# Patient Record
(demographics unavailable — no encounter records)

---

## 2024-12-19 NOTE — PHYSICAL EXAM
[Alert] : alert [Oriented to Person] : oriented to person [Oriented to Place] : oriented to place [Oriented to Time] : oriented to time [Calm] : calm [de-identified] : WDWIRVING [de-identified] : ANICTERIC [FreeTextEntry1] : pre-op for EUA/pouchoscopy

## 2024-12-19 NOTE — PHYSICAL EXAM
[Alert] : alert [Oriented to Person] : oriented to person [Oriented to Place] : oriented to place [Oriented to Time] : oriented to time [Calm] : calm [de-identified] : WDWIRVING [de-identified] : ANICTERIC [FreeTextEntry1] : pre-op for EUA/pouchoscopy

## 2024-12-19 NOTE — ASSESSMENT
[FreeTextEntry1] : 74 yo male with S pouch for evaluation of possible anal stricture and investigation for history of recurrent bowel obstructions. He is optimized for this procedure. He will do an enema/miralax prep Risks, Benefits, Plan and Alternatives discussed and they consent.  12/18/24 EUA/pouchoscopy 12/19/24 Enema prep Risks, Benefits, plan and alternatives discussed and they Consent. Patient is a well-known patient of mine he feels that he is getting obstructed.  Will get a proceed accordingly with exam under anesthesia flexible pouchoscopy.  Risk benefits morbidity were explained he consents.  Spent 10 to 15 minutes time face-to-face.

## 2024-12-19 NOTE — REASON FOR VISIT
[Follow-Up] : a follow-up visit [Home] : at home, [unfilled] , at the time of the visit. [Medical Office: ([facility])___] : at the medical office located in  [FreeTextEntry1] : pre-op for EUA/pouchoscopy

## 2024-12-19 NOTE — REASON FOR VISIT
[Follow-Up] : a follow-up visit [Home] : at home, [unfilled] , at the time of the visit. [Medical Office: ([facility])___] : at the medical office located in  [FreeTextEntry1] : pre-op for EUA/pouchoscopy

## 2024-12-19 NOTE — PHYSICAL EXAM
[Alert] : alert [Oriented to Person] : oriented to person [Oriented to Place] : oriented to place [Oriented to Time] : oriented to time [Calm] : calm [de-identified] : WDWIRVING [de-identified] : ANICTERIC [FreeTextEntry1] : pre-op for EUA/pouchoscopy

## 2024-12-19 NOTE — HISTORY OF PRESENT ILLNESS
[FreeTextEntry1] : 73 y.o male with hx of S-pouch and recently has been getting blockages weekly again. He saw Dr. Ibarra on 4/3/2024 - he started taking some miralax in the morning and it has helped him to prevent blockages.  Blockages  1/10/2024 (blood in stool 2/10) 2/11/2024 2/18/2024 2/23/2024 2/25/2024 blood in stool 3/11,3/14 3/15/2024 3/22/2024 Blood in stool 3/24 3/27/2024 Blood in stool 3/28 started miralax on 4/4 1 tsp in the morning - has not had an obstruction.    12/18/24 Pt here today via video visit for pre-op for EUA/pouchoscopy on 12/19/24 He is "doing ok". He is ready to proceed with procedure tomorrow.  Denies fever, cough, chest pain, or SOB.

## 2024-12-19 NOTE — REASON FOR VISIT
[Follow-Up] : a follow-up visit [Home] : at home, [unfilled] , at the time of the visit. [Medical Office: ([facility])___] : at the medical office located in  [FreeTextEntry1] : pre-op for EUA/pouchoscopy

## 2024-12-19 NOTE — ASSESSMENT
[FreeTextEntry1] : 72 yo male with S pouch for evaluation of possible anal stricture and investigation for history of recurrent bowel obstructions. He is optimized for this procedure. He will do an enema/miralax prep Risks, Benefits, Plan and Alternatives discussed and they consent.  12/18/24 EUA/pouchoscopy 12/19/24 Enema prep Risks, Benefits, plan and alternatives discussed and they Consent. Patient is a well-known patient of mine he feels that he is getting obstructed.  Will get a proceed accordingly with exam under anesthesia flexible pouchoscopy.  Risk benefits morbidity were explained he consents.  Spent 10 to 15 minutes time face-to-face.

## 2025-04-07 NOTE — ASSESSMENT
[FreeTextEntry1] : 72 yo male with S pouch for evaluation of possible anal stricture and investigation for history of recurrent bowel obstructions. He is optimized for this procedure. He will do an enema/miralax prep Risks, Benefits, Plan and Alternatives discussed and they consent.  12/18/24 EUA/pouchoscopy 12/19/24 Enema prep Risks, Benefits, plan and alternatives discussed and they Consent. Patient is a well-known patient of mine he feels that he is getting obstructed.  Will get a proceed accordingly with exam under anesthesia flexible pouchoscopy.  Risk benefits morbidity were explained he consents.  Spent 10 to 15 minutes time face-to-face.  4/7/2025 Teleconsent obtained for future EUA/pouchoscopy Patient is a well-known patient of mine.  He has a known ileostomy site stricture.  He went through a lot through the kidney and the bladder stones and some medication changes.  The plan is to do a exam under anesthesia flexible pouchoscopy plus minus dilatation.  I spent 30 minutes time face-to-face.  Risk benefits morbidity were explained he consents for the procedure.

## 2025-04-07 NOTE — ASSESSMENT
[FreeTextEntry1] : 74 yo male with S pouch for evaluation of possible anal stricture and investigation for history of recurrent bowel obstructions. He is optimized for this procedure. He will do an enema/miralax prep Risks, Benefits, Plan and Alternatives discussed and they consent.  12/18/24 EUA/pouchoscopy 12/19/24 Enema prep Risks, Benefits, plan and alternatives discussed and they Consent. Patient is a well-known patient of mine he feels that he is getting obstructed.  Will get a proceed accordingly with exam under anesthesia flexible pouchoscopy.  Risk benefits morbidity were explained he consents.  Spent 10 to 15 minutes time face-to-face.  4/7/2025 Teleconsent obtained for future EUA/pouchoscopy Patient is a well-known patient of mine.  He has a known ileostomy site stricture.  He went through a lot through the kidney and the bladder stones and some medication changes.  The plan is to do a exam under anesthesia flexible pouchoscopy plus minus dilatation.  I spent 30 minutes time face-to-face.  Risk benefits morbidity were explained he consents for the procedure.

## 2025-04-07 NOTE — HISTORY OF PRESENT ILLNESS
[FreeTextEntry1] : 73 y.o male with hx of S-pouch and recently has been getting blockages weekly again. He saw Dr. Ibarra on 4/3/2024 - he started taking some miralax in the morning and it has helped him to prevent blockages.  Blockages  1/10/2024 (blood in stool 2/10) 2/11/2024 2/18/2024 2/23/2024 2/25/2024 blood in stool 3/11,3/14 3/15/2024 3/22/2024 Blood in stool 3/24 3/27/2024 Blood in stool 3/28 started miralax on 4/4 1 tsp in the morning - has not had an obstruction.    12/18/24 Pt here today via video visit for pre-op for EUA/pouchoscopy on 12/19/24 He is "doing ok". He is ready to proceed with procedure tomorrow.  Denies fever, cough, chest pain, or SOB.   4/7/2025 He is doing well - he does get some intermittent minor blockages. He hurt his back last year and saw an Orthopedist and some herniated discs - he did PT which helped a little bit and 1/8/2025 - he fell on the ice at work - he fractured 3 ribs on left side and 9th rib on right side. He was also found to have a bladder stone - that was treated. He will go back in 3 weeks for follow up. He will check up with cardio and tomorrow he will get his Entyvio treatment

## 2025-04-07 NOTE — HISTORY OF PRESENT ILLNESS
See Scanned Documents.   [FreeTextEntry1] : 73 y.o male with hx of S-pouch and recently has been getting blockages weekly again. He saw Dr. Ibarra on 4/3/2024 - he started taking some miralax in the morning and it has helped him to prevent blockages.  Blockages  1/10/2024 (blood in stool 2/10) 2/11/2024 2/18/2024 2/23/2024 2/25/2024 blood in stool 3/11,3/14 3/15/2024 3/22/2024 Blood in stool 3/24 3/27/2024 Blood in stool 3/28 started miralax on 4/4 1 tsp in the morning - has not had an obstruction.    12/18/24 Pt here today via video visit for pre-op for EUA/pouchoscopy on 12/19/24 He is "doing ok". He is ready to proceed with procedure tomorrow.  Denies fever, cough, chest pain, or SOB.   4/7/2025 He is doing well - he does get some intermittent minor blockages. He hurt his back last year and saw an Orthopedist and some herniated discs - he did PT which helped a little bit and 1/8/2025 - he fell on the ice at work - he fractured 3 ribs on left side and 9th rib on right side. He was also found to have a bladder stone - that was treated. He will go back in 3 weeks for follow up. He will check up with cardio and tomorrow he will get his Entyvio treatment